# Patient Record
Sex: MALE | Race: WHITE | Employment: UNEMPLOYED
[De-identification: names, ages, dates, MRNs, and addresses within clinical notes are randomized per-mention and may not be internally consistent; named-entity substitution may affect disease eponyms.]

---

## 2017-06-22 ENCOUNTER — HOSPITAL ENCOUNTER (OUTPATIENT)
Dept: MRI IMAGING | Facility: CLINIC | Age: 42
Discharge: HOME | End: 2017-06-22
Payer: COMMERCIAL

## 2017-06-22 DIAGNOSIS — S46.211A BICEPS MUSCLE TEAR, RIGHT, INITIAL ENCOUNTER: ICD-10-CM

## 2017-06-22 PROCEDURE — 73221 MRI JOINT UPR EXTREM W/O DYE: CPT

## 2019-06-12 ENCOUNTER — HOSPITAL ENCOUNTER (OUTPATIENT)
Dept: PHYSICAL THERAPY | Facility: CLINIC | Age: 44
Setting detail: THERAPIES SERIES
Discharge: HOME | End: 2019-06-12
Payer: COMMERCIAL

## 2019-06-12 PROCEDURE — 97110 THERAPEUTIC EXERCISES: CPT

## 2019-06-12 PROCEDURE — 97161 PT EVAL LOW COMPLEX 20 MIN: CPT

## 2019-06-12 PROCEDURE — 97140 MANUAL THERAPY 1/> REGIONS: CPT

## 2019-06-12 NOTE — CONSULTS
[x] [facility]    [address] [place] [address]    Phone: [phone]    Fax:  [phone]     Physical Therapy Evaluation    Date:  2019  Patient: Clau Lancaster   : 1975  MRN: [id number]  Physician: Dr. Suzanne Le   Insurance:[facility] (12 visits 19 end date)  Medical Diagnosis:LBP   Rehab Codes: M54.16, M51.26  Onset date: 18     Next Dr's appt.: unknown    Subjective:   CC:Pt reports multiple compression injuries occurring over the years starting 9/15/2011. Had discectomy. Have had back pain since but in early August of last year it became worse. Just bent over slightly and reached for something small and had sharp pain in LB. Went to PT in April. Would only provide temporary relief of symptoms. Feel like something needs to pop in R LB. Once I had my wife hold at my tailbone area while leaning over R side LBP with R buttock and thigh pain. PMHx: [] Unremarkable [] Diabetes [] HTN  [] Pacemaker   [] MI/Heart Problems [] Cancer [] Arthritis [] Other:              [x] Refer to full medical chart  In EPIC     Tests: [] X-Ray: [x] MRI: Disc bulges L3-S1 with mild neural forminal narrowingL4-5, L5-S1 to the R, Also some ankylosing spondylitis noted.  [] none:     Medications: [x] Refer to full medical record [] None [] Other:  Allergies:      [x] Refer to full medical record [] None [] Other:    Working:  [x] Normal Duty  [] Light Duty  [] Off D/T Condition  [] Retired    [] Not Employed    []  Disability  [] Other:           Return to work:     Job/ADL Description:  [employer]     Pain:  [x] Yes  [] No Location: R LB and thigh Pain Rating: (0-10 scale)  7/10 now  Pain altered Tx:  [x] Yes  [] No  Action: Focused on manual techniques and stretching this date  Symptoms:  [] Improving [] Worsening [] Same  Better:  [] AM    [] PM    [] Sit    [] Not running  []Stand    [] Walk    [] Stretching  [x] Other: positions, dominant squatting and lunging for strength training and for back protection during functional activities  4. Able to hold corrections at pelvis and spine to allow pt to be more successful at holding good posture   5. Independent with Home Exercise Programs    LTG: (to be met in 10 treatments)  1. 5/5 hip strength in ext, abd, ER to allow pt to reduce load on back  2. Oswestry at 10% or less                 Patient goals: Get rid of pain    Rehab Potential:  [x] Good  [] Fair  [] Poor   Suggested Professional Referral:  [x] No  [] Yes:  Barriers to Goal Achievement[de-identified]  [x] No  [] Yes:  Domestic Concerns:  [x] No  [] Yes:    Pt. Education:  [x] Plans/Goals, Risks/Benefits discussed  [x] Home exercise program    Method of Education: [] Verbal  [] Demo  [x] Written- Hip flexor stretch, 2 pilates stretches, check and correct posture in sitting and standing  Comprehension of Education:  [x] Verbalizes understanding. [] Demonstrates understanding. [] Needs Review. [] Demonstrates/verbalizes understanding of HEP/Ed previously given.     Treatment Plan:  [x] Therapeutic Exercise    [] Therapeutic Activity  [x] Manual Therapy   [] Alter G treadmill  [] Phys perf test     [x] HP/CP   [] Neuromuscular Re-education [x] Dry Needling     [x] Instruction in HEP     [] Aquatic Therapy                           Frequency:  2x/week for 16 visits    Todays Treatment:  Manual: MET to correct R post innom rotation, MOB FRSR T10, MET R post rib 10, DI B hip flexor, glute med piriformis   Precautions: standard  Exercises:  Exercise Reps/ Time Weight/ Level Issued for HEP  Comments   Prone        Flying squirrels        Hip ext (glut max)                Supine        Hip flexor stretch 2'ea  x x    1 legged bridges        Frog legged bridges        Sidelying        Rotational Pilates stretch arm outstretched x5  x x    [place] pilates stretch x5  x x    Clams 90/30 deg        Ivory hip abd        Gym        Lunges        Monster walks Heel taps        Step downs     Posterior and lateral   Other:    Specific Instructions for next treatment:    Treatment Charges: Mins Units   [x] Evaluation       [x]  Low       []  Moderate       []  High 15 1   [] Phys perf test     []  Ther Exercise 15 1   [x]  Manual Therapy 20 1   []  Ther Activities     []  Aquatics     []  Vasocompression     []  NMR       TOTAL TREATMENT TIME: 50    Time in:1015   Time Out:1105    Electronically signed by: Deanna Cooks, PT          Physician Signature:________________________________Date:__________________  By signing above or cosigning this note, I have reviewed this plan of care and certify a need for medically necessary rehabilitation services.      *PLEASE SIGN ABOVE AND FAX BACK ALL PAGES*

## 2019-06-13 ENCOUNTER — HOSPITAL ENCOUNTER (OUTPATIENT)
Dept: PHYSICAL THERAPY | Facility: CLINIC | Age: 44
Setting detail: THERAPIES SERIES
Discharge: HOME | End: 2019-06-13
Payer: COMMERCIAL

## 2019-06-13 PROCEDURE — 97140 MANUAL THERAPY 1/> REGIONS: CPT

## 2019-06-13 PROCEDURE — 97110 THERAPEUTIC EXERCISES: CPT

## 2019-06-13 NOTE — FLOWSHEET NOTE
[] [facility]  [facility]  [address]  [phone]  F: [phone] [] [address]  [facility]   [address]  P: [phone]  F: [phone] [] [address]  [address] [facility]  P: [phone]  F: [phone] [] [address]  [facility]   [address]  [place]: [phone]  F: [phone]     Physical Therapy Daily Treatment Note    Date:  2019  Patient Name:  Margie Crowder    :  1975  MRN: [id number]  Physician: Dr. Vanita Estrada      Insurance:[facility] (12 visits 19 end date)  Medical Diagnosis:LBP          Rehab Codes: M54.16, M51.26  Onset date: 18                               Next 's appt.: unknown  Visit# / total visits: 212    Cancels/No Shows: 0    Subjective:    Pain:  [] Yes  [] No Location: LB, R buttock and thigh Pain Rating: (0-10 scale) 6/10  Pain altered Tx:  [] No  [] Yes  Action:  Comments:Felt looser when I left and less painful.  Tried doing hip flexor stretch again that night and it made the hip flexor cramp a little    Objective:  Manual: MET to correct R post innom rotation, MOB FRSR T10, MET R post rib 10, DI B hip flexor, glute med, piriformis   Precautions: standard  Exercises:  Exercise Reps/ Time Weight/ Level Issued for HEP   Comments   Prone             Hip ER roseanna  10x10\"    x  x     Hip ext (glut max)  2x10    x  x                   Supine             Hip flexor stretch 2'ea   x x     1 legged bridges             Frog legged bridges             Sidelying             Rotational Pilates stretch arm outstretched x5   x x     [place] pilates stretch x5   x x     Clams 90/30 deg  1 set ea    x  x     Ivory hip abd  1 set     x  x     Gym             Seated glute set x10  x x Single glute max and double   Lunges             Monster walks             Heel taps             Step downs         Posterior and lateral   Other:     Specific Instructions for next treatment:Continue manual techniques. Add Bridge and SL bridge          Treatment Charges: Mins Units   []  Modalities     [x]  Ther Exercise 30 2   [x]  Manual Therapy 20 1   []  Ther Activities     []  Aquatics     []  Vasocompression     []  Other     Total Treatment time 50 3       Assessment: [] Progressing toward goals. [] No change. [x] Other: Pt is in hamstring and erector spinae dominant muscle recruitment pattern. Having great difficulty recruiting glute max. With prone glute strengthening had to have pt give himself a manual cue to improve glute recruitment. More difficult on R than L and increased hip flexor spasm R vs L. Hamstring cramping present. STG: (to be met in 6 treatments)  1. ? Pain:3/10 and centralize to LB  2. ? ROM:Normal Trunk flexion and extension, normal hip extension B to allow pt to activate glutes for posture   3. ? Function: Pt able to perform sit to stand with ease and no compensation as well as able to perform glute dominant squatting and lunging for strength training and for back protection during functional activities  4. Able to hold corrections at pelvis and spine to allow pt to be more successful at holding good posture   5. Independent with Home Exercise Programs     LTG: (to be met in 12 treatments)  1. 5/5 hip strength in ext, abd, ER to allow pt to reduce load on back  2. Oswestry at 10% or less                 Patient goals: Get rid of pain    Goal treatment visit modified as mistake made in documentation on eval with visit number stating 5 for STG and 10 LTG. Should read as above with STG 6 treatments and LTG 12 treatments. Pt. Education:  [x] Yes  [] No  [x] Reviewed Prior HEP/Ed  Method of Education: [x] Verbal  [x] Demo  [x] Written  Comprehension of Education:  [x] Verbalizes understanding. [] Demonstrates understanding.   [x] Needs review. [] Demonstrates/verbalizes HEP/Ed previously given. Plan: [x] Continue per plan of care.    [] Other:      Time In:1250            Time Out: 1340    Electronically signed by:  Woody Tay PT

## 2019-06-18 ENCOUNTER — HOSPITAL ENCOUNTER (OUTPATIENT)
Dept: PHYSICAL THERAPY | Facility: CLINIC | Age: 44
Setting detail: THERAPIES SERIES
Discharge: HOME | End: 2019-06-18
Payer: COMMERCIAL

## 2019-06-18 PROCEDURE — 97110 THERAPEUTIC EXERCISES: CPT

## 2019-06-18 PROCEDURE — 97140 MANUAL THERAPY 1/> REGIONS: CPT

## 2019-06-18 NOTE — FLOWSHEET NOTE
[] [facility] &  Therapy  [address]  [phone]  F: [phone] [] [address]  [place] [address]   [address]  P: [phone]  F: [phone] [x] [address] &  Therapy  [address] [facility] [facility] [address]  P: [phone]  F: [phone] [] [address]  [facility]   [address]  [place]: [phone]  F: [phone]     Physical Therapy Daily Treatment Note    Date:  2019  Patient Name:  David Everett    :  1975  MRN: [id number]  Physician: Dr. Senia Altamirano      Insurance:[facility] (12 visits 19 end date)  Medical Diagnosis:LBP          Rehab Codes: M54.16, M51.26  Onset date: 18                               Next 's appt.: unknown  Visit# / total visits: 3/12    Cancels/No Shows: 0    Subjective:    Pain:  [x] Yes  [] No Location: LB, R buttock and thigh Pain Rating: (0-10 scale) 6/10  Pain altered Tx:  [] No  [] Yes  Action:  Comments: Pt reported his back is still painful to R side. Objective:  Manual: MET to correct R post innom rotation (L LE longer)  which did not correct (level pelvis/PSIS).  DI to L glute med, hip flexor DI proximal, IASTM to L glute med and piriformis with hypervolt, grade III lumbar rotation opening technique 5x5'' opening R, Grade IV SI joint rotation opening R 3x5''  Precautions: standard  Exercises:  Exercise Reps/ Time Weight/ Level Issued for HEP   Comments   Prone             Hip ER roseanna  10x10\"    x       Hip ext (glut max)  2x10    x  x                   Supine             Hip flexor stretch 2'ea   x x     1 legged bridges  20x      x     Frog legged bridges             Rocking piri S 15x3\"   x    Pushing piri S 3x30s   x    Sidelying             Rotational Pilates stretch arm outstretched x5   x      [place] pilates stretch x5   x      Clams 90  20x  Lime Grn  x  x     Ivory hip abd  1 set     x       Gym             Seated glute set x10  x  Single glute max and double   Lunges  3L      x     Monster walks  3L  blue tube    x     Heel taps             Step downs         Posterior and lateral   Other:     Specific Instructions for next treatment:Continue manual techniques. Treatment Charges: Mins Units   []  Modalities     [x]  Ther Exercise 25 2   [x]  Manual Therapy 20 1   []  Ther Activities     []  Aquatics     []  Vasocompression     []  Other     Total Treatment time 45 3       Assessment: [] Progressing toward goals. [] No change. [x] Other: progressed pt with more ROM exercises to improve ROM at glutes and decrease tightness. Completed sidelying lumbar stretch manually to stretch lumbar paraspinals. Added monster walks and lunges to further increase strength to glutes and hamstrings. Pt with good tolerance to exercises with no increases in pain noted. STG: (to be met in 6 treatments)  1. ? Pain:3/10 and centralize to LB  2. ? ROM:Normal Trunk flexion and extension, normal hip extension B to allow pt to activate glutes for posture   3. ? Function: Pt able to perform sit to stand with ease and no compensation as well as able to perform glute dominant squatting and lunging for strength training and for back protection during functional activities  4. Able to hold corrections at pelvis and spine to allow pt to be more successful at holding good posture   5. Independent with Home Exercise Programs     LTG: (to be met in 12 treatments)  1. 5/5 hip strength in ext, abd, ER to allow pt to reduce load on back  2. Oswestry at 10% or less                 Patient goals: Get rid of pain    Goal treatment visit modified as mistake made in documentation on eval with visit number stating 5 for STG and 10 LTG. Should read as above with STG 6 treatments and LTG 12 treatments.     Pt. Education:  [x] Yes  [] No  [x] Reviewed Prior HEP/Ed  Method of Education: [x] Verbal  [x] Demo  [x] Written  Comprehension of Education:  [x] Verbalizes understanding. [] Demonstrates understanding. [x] Needs review. [] Demonstrates/verbalizes HEP/Ed previously given. Plan: [x] Continue per plan of care. [] Other:      Time In:10:00am            Time Out: 10:48am    Electronically signed by:  JUSTO Gary.  This document has been reviewed by overseeing Physical Therapist, Mera Fritz, PT DPT

## 2019-06-19 ENCOUNTER — APPOINTMENT (OUTPATIENT)
Dept: PHYSICAL THERAPY | Facility: CLINIC | Age: 44
End: 2019-06-19
Payer: COMMERCIAL

## 2019-06-20 ENCOUNTER — APPOINTMENT (OUTPATIENT)
Dept: PHYSICAL THERAPY | Facility: CLINIC | Age: 44
End: 2019-06-20
Payer: COMMERCIAL

## 2019-06-24 ENCOUNTER — HOSPITAL ENCOUNTER (OUTPATIENT)
Dept: PHYSICAL THERAPY | Facility: CLINIC | Age: 44
Setting detail: THERAPIES SERIES
Discharge: HOME | End: 2019-06-24
Payer: COMMERCIAL

## 2019-06-24 PROCEDURE — 97110 THERAPEUTIC EXERCISES: CPT

## 2019-06-24 PROCEDURE — 97140 MANUAL THERAPY 1/> REGIONS: CPT

## 2019-06-27 ENCOUNTER — HOSPITAL ENCOUNTER (OUTPATIENT)
Dept: PHYSICAL THERAPY | Facility: CLINIC | Age: 44
Setting detail: THERAPIES SERIES
Discharge: HOME | End: 2019-06-27
Payer: COMMERCIAL

## 2019-06-27 PROCEDURE — 97140 MANUAL THERAPY 1/> REGIONS: CPT

## 2019-06-27 PROCEDURE — 97110 THERAPEUTIC EXERCISES: CPT

## 2019-06-27 NOTE — FLOWSHEET NOTE
[x] [place] [address]  Outpatient Rehabilitation &  Therapy  [address] [place] [address]  P: [phone]  F: [phone]     Physical Therapy Daily Treatment Note    Date:  2019  Patient Name:  Richelle Pitt    :  1975  MRN: [id number]  Physician: Dr. Giulia Davis      Insurance:[facility] (12 visits 19 end date)  Medical Diagnosis:LBP          Rehab Codes: M54.16, M51.26  Onset date: 18                               Next 's appt.: unknown  Visit# / total visits:     Cancels/No Shows: 0    Subjective:    Pain:  [x] Yes  [] No Location: LB, R buttock and thigh Pain Rating: (0-10 scale) 6/10  Pain altered Tx:  [] No  [] Yes  Action:  Comments: Pt reports his R leg is no longer hurting. Just into R buttock. Have been doing stretches    Objective:  Manual:   DI to L glute med, B hip flexor DI proximal, B hip flexor prox and distal  Precautions: standard  Exercises:  Exercise Reps/ Time Weight/ Level Issued for HEP   Comments   Prone             Hip ER roseanna  10x10\"    x  x     Hip ext (glut max)  2x10    x  x      L quad stretch  3x30\"      x     Supine             Hip flexor stretch 2'ea   x x     1 legged bridges  20x           Frog legged bridges             Rocking piri S 15x3\"       Pushing piri S 3x30s       Sidelying             Rotational Pilates stretch arm outstretched x5   x x     [place] pilates stretch x5   x x     Clams 90  20x  Lime Grn  x        hip abd  1 set   Lime  x      Gym             Seated glute set x10  x  Single glute max and double   Lunges  3L           Monster walks  3L  blue tube         4 way hip  x20  Blue  x  x     Step downs         Posterior and lateral   Other:     Specific Instructions for next treatment:Continue manual techniques.            Treatment Charges: Mins Units   []  Modalities     [x]  Ther Exercise 25 2   [x]  Manual Therapy 20 1   []  Ther Activities     []  Aquatics     []  Vasocompression     []  Other     Total Treatment time 45 3 Assessment: [] Progressing toward goals. [] No change. [x] Other: Added standing hip stability this date after patient was unable to get proper glute recruitment to complete flying squirrel exercise in prone. Pt laterally shifts heavily until cued to use glute and abdominals in single leg standing. STG: (to be met in 6 treatments)  1. ? Pain:3/10 and centralize to LB  2. ? ROM:Normal Trunk flexion and extension, normal hip extension B to allow pt to activate glutes for posture   3. ? Function: Pt able to perform sit to stand with ease and no compensation as well as able to perform glute dominant squatting and lunging for strength training and for back protection during functional activities  4. Able to hold corrections at pelvis and spine to allow pt to be more successful at holding good posture   5. Independent with Home Exercise Programs     LTG: (to be met in 12 treatments)  1. 5/5 hip strength in ext, abd, ER to allow pt to reduce load on back  2. Oswestry at 10% or less                 Patient goals: Get rid of pain    Goal treatment visit modified as mistake made in documentation on eval with visit number stating 5 for STG and 10 LTG. Should read as above with STG 6 treatments and LTG 12 treatments. Pt. Education:  [x] Yes  [] No  [x] Reviewed Prior HEP/Ed  Method of Education: [x] Verbal  [x] Demo  [x] Written  Comprehension of Education:  [x] Verbalizes understanding. [] Demonstrates understanding. [x] Needs review. [] Demonstrates/verbalizes HEP/Ed previously given. Plan: [x] Continue per plan of care.    [] Other:      Time In:1038           Time Out: 1130    Electronically signed by:  Julia Crow, PT

## 2019-07-01 ENCOUNTER — HOSPITAL ENCOUNTER (OUTPATIENT)
Dept: PHYSICAL THERAPY | Facility: CLINIC | Age: 44
Setting detail: THERAPIES SERIES
Discharge: HOME | End: 2019-07-01
Payer: COMMERCIAL

## 2019-07-01 PROCEDURE — 97110 THERAPEUTIC EXERCISES: CPT

## 2019-07-01 PROCEDURE — 97140 MANUAL THERAPY 1/> REGIONS: CPT

## 2019-07-03 ENCOUNTER — HOSPITAL ENCOUNTER (OUTPATIENT)
Dept: PHYSICAL THERAPY | Facility: CLINIC | Age: 44
Setting detail: THERAPIES SERIES
Discharge: HOME | End: 2019-07-03
Payer: COMMERCIAL

## 2019-07-03 PROCEDURE — 97110 THERAPEUTIC EXERCISES: CPT

## 2019-07-03 PROCEDURE — 97140 MANUAL THERAPY 1/> REGIONS: CPT

## 2019-07-03 NOTE — FLOWSHEET NOTE
[x] [address]   Outpatient Rehabilitation &  Therapy  [address] [facility]  P: [phone]  F: [phone]     Physical Therapy Daily Treatment Note    Date:  7/3/2019  Patient Name:  Pelon Truong    :  1975  MRN: [id number]  Physician: Dr. Иван Rodríguez      Insurance:[facility] (12 visits 19 end date)  Medical Diagnosis:LBP           Rehab Codes: M54.16, M51.26  Onset date: 18                               Next 's appt.: unknown  Visit# / total visits:     Cancels/No Shows: 0    Subjective:    Pain:  [x] Yes  [] No Location: LB, R buttock and thigh Pain Rating: (0-10 scale) 6/10  Pain altered Tx:  [] No  [] Yes  Action:  Comments: Pt reports his LB is doing a bit better. R buttock pain is still present. Went to PCP today and reflexes checked. States since back surgery has no patellar tendon reflex on R and diminished on L.     Objective:  Manual:  DI to L glute med, piriformis, DI  B hip flexor prox and distal  IDN to R SI joint/superior cluneal/inferior gluteal homeostatics and into R hip ER's- no adverse reactions, performed by Charanjit Menchaca, PT    Precautions: standard  Exercises:  Exercise Reps/ Time Weight/ Level Issued for HEP   Comments   Prone             Hip ER roseanna  10x10\"    x       Hip ext (glut max)  2x10    x        L quad stretch  3x30\"           Supine             Hip flexor stretch 2'ea   x x     Piriformis s 3x30\"  x x    1 legged bridges  20x           Frog legged bridges             Rocking piri S 15x3\"       Pushing piri S 3x30s       Sidelying             Rotational Pilates stretch arm outstretched x5   x      [place] pilates stretch x5   x      Clams 90  20x  Lime Grn  x        hip abd  1 set   Lime  x      Gym             Quadruped opp UE/LE 2x10       Side plank 2x10       Arm circles x20 Levine x     Pull aparts x20 Grey x     Kettle bell squats x20 22# x x    Lunges  3L           Monster walks  3L  blue tube x       4 way hip @CC  x10  20#  x  x   Single leg deadlift w/ kettle bell    #     Posterior and lateral   Other:     Specific Instructions for next treatment:Continue manual techniques. Treatment Charges: Mins Units   []  Modalities     [x]  Ther Exercise 15 1   [x]  Manual Therapy 30 2   []  Ther Activities     []  Aquatics     []  Vasocompression     []  Other     Total Treatment time 45 3       Assessment: [] Progressing toward goals. [] No change. [x] Other: Added IDN this date due to complaints of increased tightness and pain in R buttock. DI was minimally effective therefore a trial of IDN with Reji [place], PT  Added. Glute med and piriformis less tender and decreased spasm noted post IDN. Pt was more stable with 4 way hip this date. Less exercise performed however due to spasm and reports of fatigue with busy work day with multiple calls and fatigue from previous PT visit. STG: (to be met in 6 treatments)  1. ? Pain:3/10 and centralize to LB  2. ? ROM:Normal Trunk flexion and extension, normal hip extension B to allow pt to activate glutes for posture   3. ? Function: Pt able to perform sit to stand with ease and no compensation as well as able to perform glute dominant squatting and lunging for strength training and for back protection during functional activities  4. Able to hold corrections at pelvis and spine to allow pt to be more successful at holding good posture   5. Independent with Home Exercise Programs     LTG: (to be met in 12 treatments)  1. 5/5 hip strength in ext, abd, ER to allow pt to reduce load on back  2. Oswestry at 10% or less                 Patient goals: Get rid of pain    Goal treatment visit modified as mistake made in documentation on eval with visit number stating 5 for STG and 10 LTG. Should read as above with STG 6 treatments and LTG 12 treatments.     Pt. Education:  [x] Yes  [] No  [x] Reviewed Prior HEP/Ed  Method of Education: [x] Verbal  [x] Demo  [x] Written  Comprehension of

## 2019-07-09 ENCOUNTER — APPOINTMENT (OUTPATIENT)
Dept: PHYSICAL THERAPY | Facility: CLINIC | Age: 44
End: 2019-07-09
Payer: COMMERCIAL

## 2019-07-10 ENCOUNTER — HOSPITAL ENCOUNTER (OUTPATIENT)
Dept: PHYSICAL THERAPY | Facility: CLINIC | Age: 44
Setting detail: THERAPIES SERIES
Discharge: HOME | End: 2019-07-10
Payer: COMMERCIAL

## 2019-07-10 PROCEDURE — 97110 THERAPEUTIC EXERCISES: CPT

## 2019-07-10 PROCEDURE — 97140 MANUAL THERAPY 1/> REGIONS: CPT

## 2019-07-10 NOTE — FLOWSHEET NOTE
[x] [address]    Outpatient Rehabilitation &  Therapy  [facility]  P: [phone]  F: [phone]     Physical Therapy Daily Treatment Note    Date:  7/10/2019  Patient Name:  Victoria Pollock    :  1975  MRN: [id number]  Physician: Dr. Dion Fitzpatrick      Insurance:[facility] (12 visits 19 end date)  Medical Diagnosis:LBP           Rehab Codes: M54.16, M51.26  Onset date: 18                               Next Dr's appt.: unknown  Visit# / total visits:     Cancels/No Shows: 0    Subjective:    Pain:  [x] Yes  [] No Location: LB, R buttock and thigh Pain Rating: (0-10 scale) did not rate  Pain altered Tx:  [] No  [] Yes  Action:  Comments: Pt reports his LB is sore. I was on vacation ans found out I'm really out of shape. Was very sore after walking many stairs and sand dunes. Objective:  Manual:  DI to L glute med, piriformis, DI  B hip flexor prox and distal      Precautions: standard  Exercises:  Exercise Reps/ Time Weight/ Level Issued for HEP   Comments   Prone             Hip ER roseanna  10x10\"    x       Hip ext (glut max)  2x10    x        L quad stretch  3x30\"           Supine             Hip flexor stretch 2'ea   x x     Piriformis s 3x30\"  x x    1 legged bridges  20x           Frog legged bridges             Rocking piri S 15x3\"       Pushing piri S 3x30s       Sidelying             Rotational Pilates stretch arm outstretched x5   x      [place] pilates stretch x5   x      Clams 90  20x  Lime Grn  x        hip abd  1 set   Lime  x      Gym             Quadruped opp UE/LE 2x10       Side plank 2x10       Arm circles x20 Levine x     Pull aparts x20 Grey x     Kettle bell squats x20 22# off 18\" stroop x x    Lunges  3L           Monster walks  3L  blue tube x x     4 way hip @CC  x10  20#  x       Single leg deadlift w/ kettle bell 2x10  5#MB    x    Heel tap 2x10 4\" x x Fwd/retro   Other:     Specific Instructions for next treatment:Continue manual techniques. Treatment Charges: Mins Units   []  Modalities     [x]  Ther Exercise 30 2   [x]  Manual Therapy 15 1   []  Ther Activities     []  Aquatics     []  Vasocompression     []  Other     Total Treatment time 45 3       Assessment: [] Progressing toward goals. [] No change. [x] Other: Pt has less muscle spasm. Difficult to engage L glute especially with single leg deadlift. STG: (to be met in 6 treatments)  1. ? Pain:3/10 and centralize to LB  2. ? ROM:Normal Trunk flexion and extension, normal hip extension B to allow pt to activate glutes for posture   3. ? Function: Pt able to perform sit to stand with ease and no compensation as well as able to perform glute dominant squatting and lunging for strength training and for back protection during functional activities  4. Able to hold corrections at pelvis and spine to allow pt to be more successful at holding good posture   5. Independent with Home Exercise Programs     LTG: (to be met in 12 treatments)  1. 5/5 hip strength in ext, abd, ER to allow pt to reduce load on back  2. Oswestry at 10% or less                 Patient goals: Get rid of pain    Goal treatment visit modified as mistake made in documentation on eval with visit number stating 5 for STG and 10 LTG. Should read as above with STG 6 treatments and LTG 12 treatments. Pt. Education:  [x] Yes  [] No  [x] Reviewed Prior HEP/Ed  Method of Education: [x] Verbal  [x] Demo  [x] Written  Comprehension of Education:  [x] Verbalizes understanding. [] Demonstrates understanding. [x] Needs review. [] Demonstrates/verbalizes HEP/Ed previously given. Plan: [x] Continue per plan of care.    [] Other:      Time WK:6520          Time Out: 9515    Electronically signed by:  Elvi Spence, PT

## 2019-07-15 ENCOUNTER — HOSPITAL ENCOUNTER (OUTPATIENT)
Dept: PHYSICAL THERAPY | Facility: CLINIC | Age: 44
Setting detail: THERAPIES SERIES
Discharge: HOME | End: 2019-07-15
Payer: COMMERCIAL

## 2019-07-18 ENCOUNTER — HOSPITAL ENCOUNTER (OUTPATIENT)
Dept: PHYSICAL THERAPY | Facility: CLINIC | Age: 44
Setting detail: THERAPIES SERIES
Discharge: HOME | End: 2019-07-18
Payer: COMMERCIAL

## 2019-07-18 PROCEDURE — 97110 THERAPEUTIC EXERCISES: CPT

## 2019-07-18 PROCEDURE — 97140 MANUAL THERAPY 1/> REGIONS: CPT

## 2019-07-18 NOTE — FLOWSHEET NOTE
for next treatment:Continue manual techniques. Treatment Charges: Mins Units   []  Modalities     [x]  Ther Exercise 30 2   [x]  Manual Therapy 15 1   []  Ther Activities     []  Aquatics     []  Vasocompression     []  Other     Total Treatment time 45 3       Assessment: [] Progressing toward goals. [] No change. [x] Other: R glute fatigues quickly vs L. Pt still has tendency to remain in Anterior pelvic tilt    STG: (to be met in 6 treatments)  1. ? Pain:3/10 and centralize to LB  2. ? ROM:Normal Trunk flexion and extension, normal hip extension B to allow pt to activate glutes for posture   3. ? Function: Pt able to perform sit to stand with ease and no compensation as well as able to perform glute dominant squatting and lunging for strength training and for back protection during functional activities  4. Able to hold corrections at pelvis and spine to allow pt to be more successful at holding good posture   5. Independent with Home Exercise Programs     LTG: (to be met in 12 treatments)  1. 5/5 hip strength in ext, abd, ER to allow pt to reduce load on back  2. Oswestry at 10% or less                 Patient goals: Get rid of pain    Goal treatment visit modified as mistake made in documentation on eval with visit number stating 5 for STG and 10 LTG. Should read as above with STG 6 treatments and LTG 12 treatments. Pt. Education:  [x] Yes  [] No  [x] Reviewed Prior HEP/Ed  Method of Education: [x] Verbal  [x] Demo  [x] Written  Comprehension of Education:  [x] Verbalizes understanding. [] Demonstrates understanding. [x] Needs review. [] Demonstrates/verbalizes HEP/Ed previously given. Plan: [x] Continue per plan of care.    [] Other:      Time In:1210          Time Out: 1310    Electronically signed by:  Manuelito Andrade, PT

## 2019-07-22 ENCOUNTER — HOSPITAL ENCOUNTER (OUTPATIENT)
Dept: PHYSICAL THERAPY | Facility: CLINIC | Age: 44
Setting detail: THERAPIES SERIES
Discharge: HOME | End: 2019-07-22
Payer: COMMERCIAL

## 2019-07-22 PROCEDURE — 97140 MANUAL THERAPY 1/> REGIONS: CPT

## 2019-07-22 PROCEDURE — 97110 THERAPEUTIC EXERCISES: CPT

## 2019-07-22 NOTE — FLOWSHEET NOTE
Instructions for next treatment:Continue manual techniques. Treatment Charges: Mins Units   []  Modalities     [x]  Ther Exercise 15 1   [x]  Manual Therapy 25 2   []  Ther Activities     []  Aquatics     []  Vasocompression     []  Other     Total Treatment time 40 3       Assessment: [] Progressing toward goals. [] No change. [x] Other: Pt continues with complaints of R LBP. His hip extension is normal but stiff at end range B. He still compensates with erector spinae and hip flexors to gain spinal stability with glutes inhibition present. Pt wishes to be done with PT as he states is has helped to point out areas of weakness and has loosened hips but has not really made a change in pain at R LB    STG: (to be met in 6 treatments)  1. ? Pain:3/10 and centralize to LB  2. ? ROM:Normal Trunk flexion and extension, normal hip extension B to allow pt to activate glutes for posture   3. ? Function: Pt able to perform sit to stand with ease and no compensation as well as able to perform glute dominant squatting and lunging for strength training and for back protection during functional activities  4. Able to hold corrections at pelvis and spine to allow pt to be more successful at holding good posture   5. Independent with Home Exercise Programs     LTG: (to be met in 12 treatments)  1. 5/5 hip strength in ext, abd, ER to allow pt to reduce load on back  2. Oswestry at 10% or less                 Patient goals: Get rid of pain    Goal treatment visit modified as mistake made in documentation on eval with visit number stating 5 for STG and 10 LTG. Should read as above with STG 6 treatments and LTG 12 treatments. Pt. Education:  [x] Yes  [] No  [x] Reviewed Prior HEP/Ed  Method of Education: [x] Verbal  [x] Demo  [x] Written  Comprehension of Education:  [x] Verbalizes understanding. [] Demonstrates understanding. [x] Needs review. [] Demonstrates/verbalizes HEP/Ed previously given.      Plan: [x]

## 2019-07-25 ENCOUNTER — APPOINTMENT (OUTPATIENT)
Dept: PHYSICAL THERAPY | Facility: CLINIC | Age: 44
End: 2019-07-25
Payer: COMMERCIAL

## 2019-10-04 ENCOUNTER — HOSPITAL ENCOUNTER (OUTPATIENT)
Age: 44
Discharge: HOME | End: 2019-10-04

## 2022-11-02 ENCOUNTER — HOSPITAL ENCOUNTER (OUTPATIENT)
Age: 47
Discharge: HOME | End: 2022-11-02

## 2022-11-02 LAB — HBV SURFACE AB TITR SER: 85.03 MIU/ML

## 2022-11-02 PROCEDURE — 36415 COLL VENOUS BLD VENIPUNCTURE: CPT

## 2022-11-02 PROCEDURE — 86317 IMMUNOASSAY INFECTIOUS AGENT: CPT

## 2022-12-08 ENCOUNTER — HOSPITAL ENCOUNTER (OUTPATIENT)
Dept: PHYSICAL THERAPY | Facility: CLINIC | Age: 47
Setting detail: THERAPIES SERIES
Discharge: HOME | End: 2022-12-08
Payer: COMMERCIAL

## 2022-12-08 PROCEDURE — 97161 PT EVAL LOW COMPLEX 20 MIN: CPT

## 2022-12-08 PROCEDURE — 97140 MANUAL THERAPY 1/> REGIONS: CPT

## 2022-12-08 PROCEDURE — 97110 THERAPEUTIC EXERCISES: CPT

## 2022-12-13 ENCOUNTER — APPOINTMENT (OUTPATIENT)
Dept: PHYSICAL THERAPY | Facility: CLINIC | Age: 47
End: 2022-12-13
Payer: COMMERCIAL

## 2022-12-15 ENCOUNTER — HOSPITAL ENCOUNTER (OUTPATIENT)
Dept: PHYSICAL THERAPY | Facility: CLINIC | Age: 47
Setting detail: THERAPIES SERIES
Discharge: HOME | End: 2022-12-15
Payer: COMMERCIAL

## 2022-12-15 PROCEDURE — 97140 MANUAL THERAPY 1/> REGIONS: CPT

## 2022-12-15 PROCEDURE — 97110 THERAPEUTIC EXERCISES: CPT

## 2022-12-15 NOTE — FLOWSHEET NOTE
[] [facility] [address]  [address]  [phone]  F: [phone] [] [address]  [facility] 36   [address]  P: [phone]  F: [phone] [x] [address] &  Therapy  [facility]  P: [phone]  F: [phone] [] [address]  P: [phone]  F: [phone] [] [address]  [facility]   [address]   [place]: [phone]  F: [phone]      Physical Therapy Daily Treatment Note    Date:  12/15/2022  Patient Name:  Yvette Rocha    :  1975  MRN: [id number]  Physician: Petra Gill APRN, CNP, Anatoly Poe (surg)   Insurance: [facility] 22-22 3Xs/wk for 3wks (9 visits) Auth# [id number]  Medical Diagnosis:   Q06.019X (ICD-10-CM) - Strain of muscle, fascia and tendon of left hip, initial encounter   S39.012A (ICD-10-CM) - Strain of lumbar region   S76.912A (ICD-10-CM) - Strain of unspecified muscles, fascia and tendons at thigh level, left thigh, initial encounter   Rehab Codes: M54.9, M79.1, M99.06, M62.830, R20.0  Onset Date: 10/30/22             Next 's appt. : ?  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 Cont's w/ N&T, shooting nerve pain B feet and L LE. Pain:  [x] Yes  [] No Location:  LB, L LE      Pain Rating: (0-10 scale) 6-7/10  Pain altered Tx:  [x] No  [] Yes  Action:  Comments:    Objective:  Modalities:   Precautions:  Manual: DN T-L paraspinals, B hips, B LE, homeostatics in prone.  MFR/TPR- lumbar paraspinals, Gmed, piriformis, prone hip flex stretch  Exercises:  Exercise Reps/ Time Weight/ Level Comments   MET x prn               SKTC  3x30       Piriformis stretch 3x30       HS stretch  3x30       Hip flexor stretch  *              Core stab: TrA contr, march  *                Other: has foam roller and       Treatment Charges: Mins Units   []  Modalities     [x]  Ther Exercise 8:02-8:17 15 1   [x]  Manual Therapy  8:22-8:54 32 2   []  Ther Activities     []  Aquatics     []  Vasocompression     [x]  Other DN 8:17-8:22 5 NC   Total Treatment time 52        Assessment: [x] Progressing toward goals. Slight SIJ dysfunction noted today, corrected w/ MET. Mod tightness throughout lumbar and hip musculature. Cont'd w/ DN w/ incr dosage and added LE's today followed by manual as detailed above w/o any adverse reaction. Rev HEP and completed as noted w/ good kasia. Will cont as kasia. [] No change. [] Other:  [x] Patient would continue to benefit from skilled physical therapy services in order to: decrease tightness in lumbar paraspinals and surrounding hip musculature, increase abdominal/core strength to increase overall lumbar stabilization and function. STG: (to be met in 9 treatments)  ? Pain: Pt will report decreased pain to 4/10 with increased activity   ? ROM: Increase flexibility and AROM limitations throughout bilat hips and full lumbar AROM without pain to reduce difficulty with ADLs  ? Strength: Pt will demonstrate improved bilat LE strength to grossly 5/5 and demo good postural and core stability    Independent with Home Exercise Programs as demonstrated by performance with correct form without cues. ? Function: Pt to report improved sleep, ability to lift/carry and complete job duties w/o difficulty  5. Improve score on assessment tool from 44% impaired to 35% impaired demonstrating an improvement in overall function     Patient goals: relief    Pt. Education:  [x] Yes  [] No  [x] Reviewed Prior HEP/Ed  Method of Education: [x] Verbal  [x] Demo  [] Written  Comprehension of Education:  [x] Verbalizes understanding.   [] Demonstrates understanding. [] Needs review. [] Demonstrates/verbalizes HEP/Ed previously given. Plan: [x] Continue current frequency toward long and short term goals.     [x] Specific Instructions for subsequent treatments: cont to monitor, progress as kasia      Time In:8:02 am            Time Out: 8:55 am    Electronically signed by:  Jennifer Cooln PT

## 2022-12-19 ENCOUNTER — HOSPITAL ENCOUNTER (OUTPATIENT)
Dept: PHYSICAL THERAPY | Facility: CLINIC | Age: 47
Setting detail: THERAPIES SERIES
Discharge: HOME | End: 2022-12-19
Payer: COMMERCIAL

## 2022-12-19 PROCEDURE — 97110 THERAPEUTIC EXERCISES: CPT

## 2022-12-19 PROCEDURE — 97140 MANUAL THERAPY 1/> REGIONS: CPT

## 2022-12-19 NOTE — FLOWSHEET NOTE
[] [facility] [address]  [address]  [phone]  F: [phone] [] [address]  [facility] 36   [address]  P: [phone]  F: [phone] [x] [place] &  Therapy  [address] [facility]  P: [phone]  F: [phone] [] [address]  P: [phone]  F: [phone] [] [address]  [facility]   [address]   [place]: [phone]  F: [phone]      Physical Therapy Daily Treatment Note    Date:  2022  Patient Name:  Js Mcintyre    :  1975  MRN: [id number]  Physician: Sachin SANDOVAL, AIDAN, Bo Peña (surg)   Insurance: [facility] 22-22 3Xs/wk for 3wks (9 visits) Auth# [id number]  Medical Diagnosis:   Z63.302D (ICD-10-CM) - Strain of muscle, fascia and tendon of left hip, initial encounter   S39.012A (ICD-10-CM) - Strain of lumbar region   S76.912A (ICD-10-CM) - Strain of unspecified muscles, fascia and tendons at thigh level, left thigh, initial encounter   Rehab Codes: M54.9, M79.1, M99.06, M62.830, R20.0  Onset Date: 10/30/22             Next 's appt. : ?  Visit# / total visits: 3   Cancels/No Shows: 0    Subjective: Pt reports he's feeling pretty good, L foot felt much better and less cramping in feet after last session. Pain:  [x] Yes  [] No Location:  LB, L LE      Pain Rating: (0-10 scale) 4/10  Pain altered Tx:  [x] No  [] Yes  Action:  Comments:    Objective:  Modalities:   Precautions:  Manual: DN T-L paraspinals, B hips, B LE, homeostatics in prone.  MFR/TPR- lumbar paraspinals, Gmed, piriformis, prone hip flex stretch, B calves  Exercises:  Exercise Reps/ Time Weight/ Level Comments   MET x prn               SKTC  3x30  HEP     Piriformis stretch  3x30       HS stretch  3x30    w/ rope, add calf   Hip flexor stretch  *              Core stab: TrA contr, march 10x * progress         Andrea pose x     Cat, cow 10x           Other: has foam roller and       Treatment Charges: Mins Units   []  Modalities     [x]  Ther Exercise 5:40-6:00 20 2   [x]  Manual Therapy  5:05-5:38 33 2   []  Ther Activities     []  Aquatics     []  Vasocompression     [x]  Other DN 5:00-5:05 5 NC   Total Treatment time 58        Assessment: [x] Progressing toward goals. No SIJ dysfunction noted today. Mod tightness throughout lumbar L>R paraspinals and hip musculature. Cont'd w/ DN w/ incr dosage followed by manual as detailed above w/o any adverse reaction. Rev HEP and completed as noted w/ added andrea pose, cat cow and TrA contraction w/ good kasia. Pt reports ex feels good and no incr in symptoms. Will cont as kasia. [] No change. [] Other:  [x] Patient would continue to benefit from skilled physical therapy services in order to: decrease tightness in lumbar paraspinals and surrounding hip musculature, increase abdominal/core strength to increase overall lumbar stabilization and function. STG: (to be met in 9 treatments)  ? Pain: Pt will report decreased pain to 4/10 with increased activity   ? ROM: Increase flexibility and AROM limitations throughout bilat hips and full lumbar AROM without pain to reduce difficulty with ADLs  ? Strength: Pt will demonstrate improved bilat LE strength to grossly 5/5 and demo good postural and core stability    Independent with Home Exercise Programs as demonstrated by performance with correct form without cues. ? Function: Pt to report improved sleep, ability to lift/carry and complete job duties w/o difficulty  5. Improve score on assessment tool from 44% impaired to 35% impaired demonstrating an improvement in overall function     Patient goals: relief    Pt.  Education:  [x] Yes  [] No  [x] Reviewed Prior HEP/Ed  Method of Education: [x] Verbal  [x] Demo  [] Written  Comprehension of Education:  [x] Verbalizes understanding. [] Demonstrates understanding. [] Needs review. [] Demonstrates/verbalizes HEP/Ed previously given. Plan: [x] Continue current frequency toward long and short term goals.     [x] Specific Instructions for subsequent treatments: cont to monitor, progress as kasia      Time In: 5:00 pm            Time Out: 6:04 pm    Electronically signed by:  Rj Krause PT

## 2022-12-22 ENCOUNTER — HOSPITAL ENCOUNTER (OUTPATIENT)
Dept: PHYSICAL THERAPY | Facility: CLINIC | Age: 47
Setting detail: THERAPIES SERIES
Discharge: HOME | End: 2022-12-22
Payer: COMMERCIAL

## 2022-12-22 NOTE — FLOWSHEET NOTE
[] [address]  [address]    [phone]  F: [phone]   [] [address]  [facility] 36   [address]  P: [phone]  F: [phone]  [x] [address] &  Therapy  [address] [facility] [address]  P: [phone]  F: [phone] [] [address]  P: [phone]  F: [phone]  [] [address]  [address] [facility] 70   [address]   [place]: [phone]  F: [phone]   [] [address]  [place]: [phone]   F: [phone]     Physical Therapy Cancel/No Show note    Date: 2022  Patient: Melissa Perez  : 1975  MRN: [id number]    Cancels/No Shows to date:     For today's appointment patient:    [x]  Cancelled    [] Rescheduled appointment    [] No-show     Reason given by patient:    []  Patient ill    []  Conflicting appointment    [] No transportation      [x] Conflict with work    [] No reason given    [] Weather related    [] COVID-19    [] Other:      Comments:        [x] Next appointment was confirmed    Electronically signed by: Robert Bazzi PT

## 2022-12-27 ENCOUNTER — HOSPITAL ENCOUNTER (OUTPATIENT)
Dept: PHYSICAL THERAPY | Facility: CLINIC | Age: 47
Setting detail: THERAPIES SERIES
Discharge: HOME | End: 2022-12-27
Payer: COMMERCIAL

## 2022-12-28 NOTE — FLOWSHEET NOTE
[] [facility]  [address]    [phone]  F: [phone]   [] [address] [facility] 36   [address]  P: [phone]  F: [phone]  [] [address]  [address] [facility] [address]  P: [phone]  F: [phone] [] [address]  P: [phone]  F: [phone]  [] [address] [facility]   [address]   [place]: [phone]  F: [phone]   [] [address]  [place]: [phone]   F: [phone]     Physical Therapy Cancel/No Show note    Date: 2022  Patient: Jerzy Coronado  : 1975  MRN: [id number]    Cancels/No Shows to date: 3/1    For today's appointment patient:    [x]  Cancelled    [] Rescheduled appointment    [] No-show     Reason given by patient:    []  Patient ill    []  Conflicting appointment    [] No transportation      [x] Conflict with work    [] No reason given    [] Weather related    [] COVID-19    [x] Other:   still waiting on C-9 auth also   Comments:        [x] Next appointment was confirmed    Electronically signed by: Salome Figueroa PT

## 2022-12-29 ENCOUNTER — HOSPITAL ENCOUNTER (OUTPATIENT)
Dept: PHYSICAL THERAPY | Facility: CLINIC | Age: 47
Setting detail: THERAPIES SERIES
Discharge: HOME | End: 2022-12-29
Payer: COMMERCIAL

## 2022-12-29 PROCEDURE — 97140 MANUAL THERAPY 1/> REGIONS: CPT

## 2022-12-29 PROCEDURE — 97110 THERAPEUTIC EXERCISES: CPT

## 2022-12-29 NOTE — FLOWSHEET NOTE
[] [facility] [address]  [address]  [phone]  F: [phone] [] [address]  [facility] 36   [address]  P: [phone]  F: [phone] [x] [address] &  Therapy  [address] [facility] [address]  P: [phone]  F: [phone] [] [address]  P: [phone]  F: [phone] [] [address]  [facility]   [address]   [place]: [phone]  F: [phone]      Physical Therapy Daily Treatment Note    Date:  2022  Patient Name:  Yvon Nolan    :  1975  MRN: [id number]  Physician: Antonio SANDOVAL, Bryce BERMUDEZ (surg)   Insurance: [facility] 22-22 3Xs/wk for 3wks (9 visits) Auth# [id number], date ext 23*  Medical Diagnosis:   M96.406V (ICD-10-CM) - Strain of muscle, fascia and tendon of left hip, initial encounter   S39.012A (ICD-10-CM) - Strain of lumbar region   S76.912A (ICD-10-CM) - Strain of unspecified muscles, fascia and tendons at thigh level, left thigh, initial encounter   Rehab Codes: M54.9, M79.1, M99.06, M62.830, R20.0  Onset Date: 10/30/22             Next 's appt. : ?  Visit# / total visits:    Cancels/No Shows: 3/1    Subjective: Pt reports he's feeling pretty good overall. Hasn't done HEP last 2 days was feeling good but has also been busy and worked overtime. Cont's w/ cramping R>L foot. Pain:  [x] Yes  [] No Location:  LB, L LE      Pain Rating: (0-10 scale) 4/10  Pain altered Tx:  [x] No  [] Yes  Action:  Comments:    Objective:  Modalities:   Precautions:  Manual: DN T-L paraspinals, B hips, B LE's, homeostatics in prone.  MFR/TPR- lumbar paraspinals, Gmed, piriformis, prone hip flex stretch, B calves  Exercises:  Exercise Reps/ Time Weight/ Level Comments   MET x prn SKTC  3x30  HEP     Piriformis stretch  3x30       HS, calf stretch  3x30   At step today   Hip flexor stretch 1m  * EOB             Core stab: TrA contr, march 10x * progress         Andrea pose x     Cat, cow 10x           Other: has foam roller and       Treatment Charges: Mins Units   []  Modalities     [x]  Ther Exercise 6:38-7:01 23 2   [x]  Manual Therapy  6:05-6:35 30 2   []  Ther Activities     []  Aquatics     []  Vasocompression     [x]  Other DN 6:00-6:05 5 NC   Total Treatment time 58        Assessment: [x] Progressing toward goals. No SIJ dysfunction noted today. Mod tightness throughout lumbar L>R paraspinals and hip musculature. Cont'd w/ DN w/ incr dosage followed by manual as detailed above w/o any adverse reaction. Rev HEP and completed as noted w/ added hip flexor stretch and progressed HS and calf stretch to step. Pt notes muscle soreness w/ core stab ex, \"can feel the muscles. \"  Pt reports ex feel good and no incr in symptoms. Will cont as kasia. 1-2x weekly. [] No change. [] Other:  [x] Patient would continue to benefit from skilled physical therapy services in order to: decrease tightness in lumbar paraspinals and surrounding hip musculature, increase abdominal/core strength to increase overall lumbar stabilization and function. STG: (to be met in 9 treatments)  ? Pain: Pt will report decreased pain to 4/10 with increased activity   ? ROM: Increase flexibility and AROM limitations throughout bilat hips and full lumbar AROM without pain to reduce difficulty with ADLs  ? Strength: Pt will demonstrate improved bilat LE strength to grossly 5/5 and demo good postural and core stability    Independent with Home Exercise Programs as demonstrated by performance with correct form without cues. ? Function: Pt to report improved sleep, ability to lift/carry and complete job duties w/o difficulty  6.    Improve score on assessment tool from 44% impaired to 35% impaired demonstrating an improvement in overall function     Patient goals: relief    Pt. Education:  [x] Yes  [] No  [x] Reviewed Prior HEP/Ed  Method of Education: [x] Verbal  [x] Demo  [] Written  Comprehension of Education:  [x] Verbalizes understanding. [] Demonstrates understanding. [] Needs review. [] Demonstrates/verbalizes HEP/Ed previously given. Plan: [x] Continue current frequency toward long and short term goals.     [x] Specific Instructions for subsequent treatments: cont to monitor, progress as kasia      Time In: 6:00 pm            Time Out: 7:04 pm    Electronically signed by:  Aman Dunham, PT

## 2023-01-05 ENCOUNTER — HOSPITAL ENCOUNTER (OUTPATIENT)
Dept: PHYSICAL THERAPY | Facility: CLINIC | Age: 48
Setting detail: THERAPIES SERIES
Discharge: HOME | End: 2023-01-05
Payer: COMMERCIAL

## 2023-01-05 PROCEDURE — 97140 MANUAL THERAPY 1/> REGIONS: CPT

## 2023-01-05 PROCEDURE — 97110 THERAPEUTIC EXERCISES: CPT

## 2023-01-05 NOTE — FLOWSHEET NOTE
212    [] [facility]) [facility] &  [facility]  [address]  [phone]  F: [phone] [] [address]  [employer] [address]   [address]  P: [phone]  F: [phone] [x] [place] &  Therapy  [address]  P: [phone]  F: [phone] [] [address]  P: [phone]  F: [phone] [] [address]  [facility]   [address]   Sarah Mylesv: [phone]  F: [phone]      Physical Therapy Daily Treatment Note    Date:  2023  Patient Name:  Farideh Rashid    :  1975  MRN: [id number]  Physician: Tatum SANDOVAL, AIDAN, Steven Pulido (surg)   Insurance: [facility] 22-22 3Xs/wk for 3wks (9 visits) Auth# [id number], date ext 23*  Medical Diagnosis:   T23.278S (ICD-10-CM) - Strain of muscle, fascia and tendon of left hip, initial encounter   S39.012A (ICD-10-CM) - Strain of lumbar region   S76.912A (ICD-10-CM) - Strain of unspecified muscles, fascia and tendons at thigh level, left thigh, initial encounter   Rehab Codes: M54.9, M79.1, M99.06, M62.830, R20.0  Onset Date: 10/30/22             Next 's appt. : ?  Visit# / total visits:    Cancels/No Shows: 3/1    Subjective: Pt reports he's sore today but has done alot of sitting the last few days and did have an independent evaluation for his claim earlier this week. HEP as kasia. Cont's w/ cramping and N&T R>L foot. States he felt good after last treatment and feels beneficial.    Pain:  [x] Yes  [] No Location:  LB, L LE      Pain Rating: (0-10 scale) 1-2/10  Pain altered Tx:  [x] No  [] Yes  Action:  Comments:    Objective:  Modalities:   Precautions:  Manual: DN T-L paraspinals, B hips, B LE's, homeostatics in prone.  MFR/TPR- lumbar paraspinals, Gmed, piriformis, prone hip flex stretch, B calves  Exercises:  Exercise Reps/ Time Weight/ Level Comments   MET x prn               SKTC  3x30  HEP     Piriformis stretch  3x30       HS, calf stretch  3x30   At step today   Hip flexor stretch 1m  * EOB   ITb stretch   x  *           Core stab: TrA contr, march 10x * progress   bridges 10x *    Clam shell 10x2 *          Andrea pose x     Cat, cow 10x           Monster walks      4 way hip            Other: has foam roller and       Treatment Charges: Mins Units   []  Modalities     [x]  Ther Exercise 2:33-3:03 30 2   [x]  Manual Therapy  2:05-2:30 25 2   []  Ther Activities     []  Aquatics     []  Vasocompression     [x]  Other DN 2:00-2:05 5 NC   Total Treatment time 58        Assessment: [x] Progressing toward goals. No SIJ dysfunction noted today. Cont's w/ mod muscular/fascial tightness throughout lumbar R>L paraspinals today and hip musculature. DN followed by manual as detailed above w/o any adverse reaction. Rev HEP and completed as noted, progressed core ex w/ added bridges, clam shells and ITband stretch. Pt kasia well w/o incr symptoms. Issued HO of new ex for HEP and encouraged daily. Will cont as kasia. 2x weekly as pt's schedule allows. [] No change. [] Other:  [x] Patient would continue to benefit from skilled physical therapy services in order to: decrease tightness in lumbar paraspinals and surrounding hip musculature, increase abdominal/core strength to increase overall lumbar stabilization and function. STG: (to be met in 9 treatments)  ? Pain: Pt will report decreased pain to 4/10 with increased activity   ? ROM: Increase flexibility and AROM limitations throughout bilat hips and full lumbar AROM without pain to reduce difficulty with ADLs  ?  Strength: Pt will demonstrate improved bilat LE strength to grossly 5/5 and demo good postural and core stability    Independent with Home Exercise Programs as demonstrated by performance with correct form without cues.  ? Function: Pt to report improved sleep, ability to lift/carry and complete job duties w/o difficulty  6. Improve score on assessment tool from 44% impaired to 35% impaired demonstrating an improvement in overall function     Patient goals: relief    Pt. Education:  [x] Yes  [] No  [x] Reviewed Prior HEP/Ed  Method of Education: [x] Verbal  [x] Demo  [] Written  Comprehension of Education:  [x] Verbalizes understanding. [] Demonstrates understanding. [] Needs review. [] Demonstrates/verbalizes HEP/Ed previously given. Plan: [x] Continue current frequency toward long and short term goals.     [x] Specific Instructions for subsequent treatments: cont to monitor, progress as kasia      Time In: 2:00 pm            Time Out: 3:06 pm    Electronically signed by:  Keira Middleton PT

## 2023-01-09 ENCOUNTER — HOSPITAL ENCOUNTER (OUTPATIENT)
Dept: PHYSICAL THERAPY | Facility: CLINIC | Age: 48
Setting detail: THERAPIES SERIES
Discharge: HOME | End: 2023-01-09
Payer: COMMERCIAL

## 2023-01-09 PROCEDURE — 97140 MANUAL THERAPY 1/> REGIONS: CPT

## 2023-01-09 PROCEDURE — 97110 THERAPEUTIC EXERCISES: CPT

## 2023-01-09 NOTE — FLOWSHEET NOTE
212    [] [facility]) [facility] &  Therapy  [address]  [phone]  F: [phone] [] [address]  [facility] [facility]   [address]  P: [phone]  F: [phone] [x] [place] &  Therapy  [address]  P: [phone]  F: [phone] [] [address]  P: [phone]  F: [phone] [] [address]  [facility]   [address]   [place]: [phone]  F: [phone]      Physical Therapy Daily Treatment Note    Date:  2023  Patient Name:  Tony Albrecht    :  1975  MRN: [id number]  Physician: Carmen SANDOVAL, AIDAN, Merrill Waite (surg)   Insurance: [facility] 22-22 3Xs/wk for 3wks (9 visits) Auth# [id number], date ext 23*  Medical Diagnosis:   P55.099X (ICD-10-CM) - Strain of muscle, fascia and tendon of left hip, initial encounter   S39.012A (ICD-10-CM) - Strain of lumbar region   S76.912A (ICD-10-CM) - Strain of unspecified muscles, fascia and tendons at thigh level, left thigh, initial encounter   Rehab Codes: M54.9, M79.1, M99.06, M62.830, R20.0  Onset Date: 10/30/22             Next 's appt.: hearing 23  Visit# / total visits:    Cancels/No Shows: 3/1    Subjective: Pt reports he's stiff and sore, was up early stretching. 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 Notes back is a little better, feet are most bothersome. Cont's w/ intermittent cramping/pain and constant N&T R>L foot. States he feels good after treatment and feels beneficial, notes HEP is going really well.     Pain:  [x] Yes  [] No Location:  LB, L LE      Pain Rating: (0-10 scale) 1-2/10  Pain altered Tx:  [x] No  [] Yes  Action:  Comments:    Objective:  Modalities:   Precautions:  Manual: MAGO TGilesL paraspinals, B hips, B LE's, homeostatics in prone. MFR/TPR- lumbar paraspinals, Gmed, piriformis, prone hip flex stretch, B calves  Exercises:  Exercise Reps/ Time Weight/ Level Comments   MET x prn               SKTC  3x30  HEP     Piriformis stretch  3x30       HS, calf stretch  3x30   At step today   Hip flexor stretch 1m   EOB   ITb stretch   x            Core stab: TrA contr, march 10x  progress   bridges 10x     Clam shell 10x2           Andrea pose x     Cat, cow 10x           Post shld rolls 10x *    Scap retraction 10x *    Thoracic stretch x * Hands clasped, or at door frame   Monster walks      4 way hip  lime          Other: has foam roller and       Treatment Charges: Mins Units   []  Modalities     [x]  Ther Exercise 9:35-10:05 30 2   [x]  Manual Therapy  9:10-9:35 25 2   []  Ther Activities     []  Aquatics     []  Vasocompression     [x]  Other DN 9:05-9:10 5 NC   Total Treatment time 60        Assessment: [x] Progressing toward goals. Pt maintaining good SIJ alignment, cont's w/ mod muscular/fascial tightness throughout lumbar R>L paraspinals and hip musculature. DN followed by manual as detailed above w/o any adverse reaction. Mod tightness noted throughout thoracic spine and pt notes it feels stiff, added post shld rolls, scapular retraction and thoracic stretch to improve mobility and posture. Rev HEP and completed as noted, kasia progressions well last visit. Pt kasia well w/o incr symptoms. Will cont as kasia. 2x weekly as pt's schedule allows remaining visits. [] No change. [] Other:  [x] Patient would continue to benefit from skilled physical therapy services in order to: decrease tightness in lumbar paraspinals and surrounding hip musculature, increase abdominal/core strength to increase overall lumbar stabilization and function. STG: (to be met in 9 treatments)  ? Pain: Pt will report decreased pain to 4/10 with increased activity   ?  ROM: Increase flexibility and AROM limitations throughout bilat hips and full lumbar AROM without pain to reduce difficulty with ADLs  ? Strength: Pt will demonstrate improved bilat LE strength to grossly 5/5 and demo good postural and core stability    Independent with Home Exercise Programs as demonstrated by performance with correct form without cues. ? Function: Pt to report improved sleep, ability to lift/carry and complete job duties w/o difficulty  6. Improve score on assessment tool from 44% impaired to 35% impaired demonstrating an improvement in overall function     Patient goals: relief    Pt. Education:  [x] Yes  [] No  [x] Reviewed Prior HEP/Ed  Method of Education: [x] Verbal  [x] Demo  [] Written  Comprehension of Education:  [x] Verbalizes understanding. [] Demonstrates understanding. [] Needs review. [] Demonstrates/verbalizes HEP/Ed previously given. Plan: [x] Continue current frequency toward long and short term goals.     [x] Specific Instructions for subsequent treatments: cont to monitor, progress as kasia      Time In: 9:05 am            Time Out: 10:07  am    Electronically signed by:  Brian Cox PT

## 2023-01-12 ENCOUNTER — HOSPITAL ENCOUNTER (OUTPATIENT)
Dept: PHYSICAL THERAPY | Facility: CLINIC | Age: 48
Setting detail: THERAPIES SERIES
Discharge: HOME | End: 2023-01-12
Payer: COMMERCIAL

## 2023-01-12 PROCEDURE — 97110 THERAPEUTIC EXERCISES: CPT

## 2023-01-12 PROCEDURE — 97140 MANUAL THERAPY 1/> REGIONS: CPT

## 2023-01-12 NOTE — FLOWSHEET NOTE
212    [] [facility]) [facility] &  Therapy  [address]  [phone]  F: [phone] [] [address]  [facility] [address]   [address]  P: [phone]  F: [phone] [x] [place] &  Therapy  [address]  P: [phone]  F: [phone] [] [address]  P: [phone]  F: [phone] [] [address]  [facility]   [address]   [place]: [phone]  F: [phone]      Physical Therapy Daily Treatment Note    Date:  2023  Patient Name:  Isac Escobar    :  1975  MRN: [id number]  Physician: Beth SANDOVAL, AIDAN, Linda Gutierrez (surg)   Insurance: [facility] 22-22 3Xs/wk for 3wks (9 visits) Auth# [id number], date ext 23*  Medical Diagnosis:   P30.698R (ICD-10-CM) - Strain of muscle, fascia and tendon of left hip, initial encounter   S39.012A (ICD-10-CM) - Strain of lumbar region   S76.912A (ICD-10-CM) - Strain of unspecified muscles, fascia and tendons at thigh level, left thigh, initial encounter   Rehab Codes: M54.9, M79.1, M99.06, M62.830, R20.0  Onset Date: 10/30/22             Next 's appt.: hearing next week? Visit# / total visits:    Cancels/No Shows: 3/1    Subjective: Pt reports his back feels pretty good, feet still cramping jackie w/o shoes and shooting pains in lower legs, constant N&T R>L foot. States he feels good after treatment and feels dry needling and manual beneficial, would like to cont w/ PT. Sleeping better. Notes HEP is going really well, plans to resume some weight lifting today.     Pain:  [x] Yes  [] No Location:  LB, L LE      Pain Rating: (0-10 scale) 3/10  Pain altered Tx:  [x] No  [] Yes  Action:  Comments:    Objective:  Modalities:   Precautions:  Manual: DN T-L paraspinals, B hips, B LE's, homeostatics in prone. MFR/TPR- lumbar paraspinals, Gmed, piriformis, prone hip flex stretch, B calves  Exercises:  Exercise Reps/ Time Weight/ Level Comments   MET x prn               SKTC  3x30  HEP     Piriformis stretch  3x30       HS, calf stretch  3x30   At step today   Hip flexor stretch 1m   EOB   ITb stretch   x            Core stab: TrA contr, march 10x  progress   bridges 10x     Clam shell 10x2           Andrea pose x     Cat, cow 10x           Post shld rolls 10x *    Scap retraction 10x *    Thoracic stretch x * Hands clasped, or at door frame         Ankle ROM x * Circles, DF/PF, INV/EVER   Toe yoga, foot dome 10x ea *          Monster walks      4 way hip  lime          Other: has foam roller and       Treatment Charges: Mins Units   []  Modalities     [x]  Ther Exercise 10:35-11:05 30 2   [x]  Manual Therapy  10:10-10:35 25 2   []  Ther Activities     []  Aquatics     []  Vasocompression     [x]  Other DN 10:05-10:10 5 NC   Total Treatment time 60        Assessment: [x] Progressing toward goals. Pt maintaining good SIJ alignment, cont's w/ muscular/fascial tightness throughout thoracic and lumbar R>L paraspinals and hip musculature. DN w/ incr dosage to thoracic spine followed by manual as detailed above w/o any adverse reaction. Rev HEP and completed as noted, added intrinsic foot muscle ex and rev ankle ROM ex. Issued HO of new HEP. Pt kasia progressions well w/o incr in symptoms. Will cont as kasia. 2x weekly as pt's schedule allows remaining visits. PN sent requesting additional visits. [] No change. [x] Other: B LEs strength 5/5  [x] Patient would continue to benefit from skilled physical therapy services in order to: decrease tightness in lumbar paraspinals and surrounding hip musculature, increase abdominal/core strength to increase overall lumbar stabilization and function. STG: (to be met in 9 treatments)  ?  Pain: Pt will report decreased pain to 4/10 with increased activity - MET  ? ROM: Increase flexibility and AROM limitations throughout bilat hips and full lumbar AROM without pain to reduce difficulty with ADLs- progressing towards  ? Strength: Pt will demonstrate improved bilat LE strength to grossly 5/5 and demo good postural and core stability- MET    Independent with Home Exercise Programs as demonstrated by performance with correct form without cues. - progressing towards  ? Function: Pt to report improved sleep, ability to lift/carry and complete job duties w/o difficulty- progressing towards  6. Improve score on assessment tool from 44% impaired to 35% impaired demonstrating an improvement in overall function-progressing towards currently 42%     Patient goals: relief    Pt. Education:  [x] Yes  [] No  [x] Reviewed Prior HEP/Ed  Method of Education: [x] Verbal  [x] Demo  [x] Written  Comprehension of Education:  [x] Verbalizes understanding. [] Demonstrates understanding. [] Needs review. [x] Demonstrates/verbalizes HEP/Ed previously given. Plan: [x] Continue current frequency toward long and short term goals. [x] Specific Instructions for subsequent treatments:  Will request additional visits/C-9 auth      Time In: 10:05 am            Time Out: 11:08  am    Electronically signed by:  Lon Chaudhari, PT

## 2023-01-13 NOTE — PROGRESS NOTES
[] [facility]) [facility] &  Therapy  [address]  [phone]  F: [phone] [] [address]  [employer] [address]   [address]  P: [phone]  F: [phone] [x] [address] &  Therapy  [address]  P: [phone]  F: [phone] [] [address]  P: [phone]  F: [phone] [] [address]  [facility]   [address]   [place]: [phone]  F: [phone]      Physical Therapy Progress Note    Date: 2023      Patient: Isac Escobar  : 1975  MRN: [id number]  Physician: Beth SANDOVAL CNP, Linda Gutierrez (surg)   Insurance: [address] [place] [address] 22-22 3Xs/wk for 3wks (9 visits) Auth# [id number], date ext 23*  Medical Diagnosis:   M46.174A (ICD-10-CM) - Strain of muscle, fascia and tendon of left hip, initial encounter   S39.012A (ICD-10-CM) - Strain of lumbar region   S76.912A (ICD-10-CM) - Strain of unspecified muscles, fascia and tendons at thigh level, left thigh, initial encounter   Rehab Codes: M54.9, M79.1, M99.06, M62.830, R20.0  Onset Date: 10/30/22             Next 's appt.: hearing next week? Visit# / total visits:             Cancels/No Shows: 3/1     Subjective: Pt reports his back feels pretty good, feet still cramping jackie w/o shoes and shooting pains in lower legs, constant N&T R>L foot. States he feels good after treatment and feels dry needling and manual beneficial, would like to cont w/ PT. Sleeping better. Notes HEP is going really well, plans to resume some weight lifting today. Pain:  [x] Yes  [] No   Location:  LB, L LE      Pain Rating: (0-10 scale) 3/10  Pain altered Tx:  [x] No  [] Yes  Action:  Comments:    Objective:  Test Measurements: B LEs strength grossly 5/5.  Lumbar spine ROM [facility] but painful jackie w/ flexion and \"tight\" end ranges  Function: Pt w/ decr pain, improved mobility, strength and sleeping better, progressing well w/ PT. Cont's w/ cramps, pain and N&T in B LE's/feet    Assessment:  STG: (to be met in 9 treatments)  ? Pain: Pt will report decreased pain to 4/10 with increased activity - MET  ? ROM: Increase flexibility and AROM limitations throughout bilat hips and full lumbar AROM without pain to reduce difficulty with ADLs- progressing towards  ? Strength: Pt will demonstrate improved bilat LE strength to grossly 5/5 and demo good postural and core stability- MET    Independent with Home Exercise Programs as demonstrated by performance with correct form without cues. - progressing towards  ? Function: Pt to report improved sleep, ability to lift/carry and complete job duties w/o difficulty- progressing towards  6. Improve score on assessment tool from 44% impaired to 35% impaired demonstrating an improvement in overall function-progressing towards currently 42%     Patient goals: relief    Treatment Plan:  [x] Therapeutic Exercise   00133  [] Iontophoresis: 4 mg/mL Dexamethasone Sodium Phosphate  mAmin  13724   [] Therapeutic Activity  45307 [] Vasopneumatic cold with compression  44835    [] Gait Training   69920 [] Ultrasound   90341   [] Neuromuscular Re-education  83244 [] Electrical Stimulation Unattended  30408   [x] Manual Therapy  19054 [] Electrical Stimulation Attended  70645   [x] Instruction in HEP  [] Lumbar/Cervical Traction  77838   [] Aquatic Therapy   13859 [] Cold/hotpack    [] Massage   96468      [] Dry Needling, 1 or 2 muscles  98854   [] Biofeedback, first 15 minutes   83130  [] Biofeedback, additional 15 minutes   45697 [x] Dry Needling, 3 or more muscles  56361       Patient Status:     [x] Continue per initial plan of care.     [x] Additional visits necessary to further progress towards goals, Will need C-9 auth*    [] Other:     Requested Frequency/Duration: 1-2 times per week for 6 treatments. Electronically signed by Elena Ray PT on 1/12/2023 at 7:58 PM      If you have any questions or concerns, please don't hesitate to call. Thank you for your referral.    Physician Signature:________________________________Date:__________________  By signing above or cosigning this note, I have reviewed this plan of care and certify a need for medically necessary rehabilitation services.      *PLEASE SIGN ABOVE AND FAX BACK ALL PAGES*

## 2025-01-03 ENCOUNTER — HOSPITAL ENCOUNTER (EMERGENCY)
Age: 50
Discharge: HOME | End: 2025-01-03
Attending: EMERGENCY MEDICINE
Payer: COMMERCIAL

## 2025-01-03 VITALS
DIASTOLIC BLOOD PRESSURE: 106 MMHG | OXYGEN SATURATION: 98 % | HEART RATE: 88 BPM | SYSTOLIC BLOOD PRESSURE: 160 MMHG | WEIGHT: 150 LBS | TEMPERATURE: 97.5 F | RESPIRATION RATE: 18 BRPM

## 2025-01-03 DIAGNOSIS — S39.012A STRAIN OF LUMBAR REGION, INITIAL ENCOUNTER: Primary | ICD-10-CM

## 2025-01-03 PROCEDURE — 6370000000 HC RX 637 (ALT 250 FOR IP)

## 2025-01-03 PROCEDURE — 99284 EMERGENCY DEPT VISIT MOD MDM: CPT

## 2025-01-03 PROCEDURE — 96372 THER/PROPH/DIAG INJ SC/IM: CPT

## 2025-01-03 PROCEDURE — 6360000002 HC RX W HCPCS

## 2025-01-03 RX ORDER — KETOROLAC TROMETHAMINE 30 MG/ML
30 INJECTION, SOLUTION INTRAMUSCULAR; INTRAVENOUS ONCE
Status: COMPLETED | OUTPATIENT
Start: 2025-01-03 | End: 2025-01-03

## 2025-01-03 RX ORDER — PREDNISONE 10 MG/1
TABLET ORAL
Qty: 20 TABLET | Refills: 0 | Status: SHIPPED | OUTPATIENT
Start: 2025-01-03 | End: 2025-01-03

## 2025-01-03 RX ORDER — PREDNISONE 10 MG/1
TABLET ORAL
Qty: 20 TABLET | Refills: 0 | Status: SHIPPED | OUTPATIENT
Start: 2025-01-03 | End: 2025-01-13

## 2025-01-03 RX ORDER — LIDOCAINE 4 G/G
1 PATCH TOPICAL DAILY
Qty: 30 PATCH | Refills: 0 | Status: SHIPPED | OUTPATIENT
Start: 2025-01-03 | End: 2025-01-03

## 2025-01-03 RX ORDER — GABAPENTIN 300 MG/1
300 CAPSULE ORAL 3 TIMES DAILY
COMMUNITY
Start: 2024-02-22

## 2025-01-03 RX ORDER — IBUPROFEN 600 MG/1
600 TABLET, FILM COATED ORAL EVERY 6 HOURS PRN
Qty: 30 TABLET | Refills: 0 | Status: SHIPPED | OUTPATIENT
Start: 2025-01-03

## 2025-01-03 RX ORDER — LOSARTAN POTASSIUM 50 MG/1
100 TABLET ORAL DAILY
COMMUNITY

## 2025-01-03 RX ORDER — IBUPROFEN 600 MG/1
600 TABLET, FILM COATED ORAL EVERY 6 HOURS PRN
Qty: 30 TABLET | Refills: 0 | Status: SHIPPED | OUTPATIENT
Start: 2025-01-03 | End: 2025-01-03

## 2025-01-03 RX ORDER — BACLOFEN 10 MG/1
10 TABLET ORAL 3 TIMES DAILY
Qty: 15 TABLET | Refills: 0 | Status: SHIPPED | OUTPATIENT
Start: 2025-01-03

## 2025-01-03 RX ORDER — BACLOFEN 10 MG/1
10 TABLET ORAL 3 TIMES DAILY
Qty: 15 TABLET | Refills: 0 | Status: SHIPPED | OUTPATIENT
Start: 2025-01-03 | End: 2025-01-03

## 2025-01-03 RX ORDER — PREDNISONE 20 MG/1
60 TABLET ORAL ONCE
Status: COMPLETED | OUTPATIENT
Start: 2025-01-03 | End: 2025-01-03

## 2025-01-03 RX ORDER — LIDOCAINE 4 G/G
1 PATCH TOPICAL ONCE
Status: DISCONTINUED | OUTPATIENT
Start: 2025-01-03 | End: 2025-01-03 | Stop reason: HOSPADM

## 2025-01-03 RX ORDER — LIDOCAINE 4 G/G
1 PATCH TOPICAL DAILY
Qty: 30 PATCH | Refills: 0 | Status: SHIPPED | OUTPATIENT
Start: 2025-01-03 | End: 2025-02-02

## 2025-01-03 RX ORDER — ATENOLOL 100 MG/1
100 TABLET ORAL
COMMUNITY

## 2025-01-03 RX ADMIN — KETOROLAC TROMETHAMINE 30 MG: 30 INJECTION, SOLUTION INTRAMUSCULAR; INTRAVENOUS at 14:22

## 2025-01-03 RX ADMIN — PREDNISONE 60 MG: 20 TABLET ORAL at 14:22

## 2025-01-03 NOTE — ED NOTES
Patient to ED for right lower back injury. Patient states he was at work helping someone to walk and they fell, pulling him down and straining his back. Patient states he has pain on the right lower back that radiates down to his lower leg. Rates pain 2/10 when sitting but states it is very painful to walk. Patient alert and oriented x4, talking in complete sentences. Respirations even and unlabored. Call light in reach, all needs met at this time

## 2025-01-03 NOTE — ED PROVIDER NOTES
[facility] EMERGENCY DEPARTMENT  Emergency Department Encounter  Emergency Medicine Resident     Pt Name:Luis Bernal  MRN: [id number]  Birthdate 1975  Date of evaluation: 1/3/25  PCP:  Kym Mcduffie MD  Note Started: 2:25 PM EST      CHIEF COMPLAINT       Chief Complaint   Patient presents with    Back Injury       HISTORY OF PRESENT ILLNESS  (Location/Symptom, Timing/Onset, Context/Setting, Quality, Duration, Modifying Factors, Severity.)      Luis Bernal is a 49 y.o. male who presents with back pain, Workmen's Comp. injury.  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  This has been going on since Tuesday.  States he has a right lower sided back pain radiating down his leg.  Has been able to ambulate with some difficulty.  Denies any fall or specific trauma otherwise.  Did not hit his head or lose consciousness.  States he has history of lower lumbar back pain and had a discectomy several years ago.  Denies any chest pain, shortness of breath, abdominal pain, nausea, vomiting, diarrhea, difficulty urinating, defecating, weakness in his legs or any other complaints.    PAST MEDICAL / SURGICAL / SOCIAL / FAMILY HISTORY      has a past medical history of Hypertension.       has no past surgical history on file.    Social History     Socioeconomic History    Marital status:      Spouse name: Not on file    Number of children: Not on file    Years of education: Not on file    Highest education level: Not on file   Occupational History    Not on file   Tobacco Use    Smoking status: Never    Smokeless tobacco: Never   Substance and Sexual Activity    Alcohol use: Yes     Comment: occasional    Drug use: Never    Sexual activity: Not on file   Other Topics Concern    Not on file   Social History Narrative    Not on file     Social Determinants of Health     Financial Resource Strain: Not on file   Food Insecurity: No Food Insecurity (4/19/2024)     VITALS:   BP (!) 160/106   Pulse 88   Temp 97.5 °F (36.4 °C)   Resp 18   Wt 68 kg (150 lb)   SpO2 98%     Physical Exam  Constitutional:       Appearance: Normal appearance.   Cardiovascular:      Rate and Rhythm: Normal rate and regular rhythm.      Pulses: Normal pulses.      Heart sounds: Normal heart sounds.   Pulmonary:      Effort: Pulmonary effort is normal.      Breath sounds: Normal breath sounds.   Abdominal:      General: Abdomen is flat.      Palpations: Abdomen is soft.      Tenderness: There is no abdominal tenderness.   Musculoskeletal:      Comments: Right-sided lumbar  paraspinal muscle tenderness.  Positive straight leg raise on the right.  No weakness.  No midline tenderness to the CT or L-spine.   Skin:     General: Skin is warm and dry.      Capillary Refill: Capillary refill takes less than 2 seconds.   Neurological:      Mental Status: He is alert.           DDX/DIAGNOSTIC RESULTS / EMERGENCY DEPARTMENT COURSE / MDM     Medical Decision Making  Luis Bernal is a 49 y.o. male who presents with back pain.  Patient is GCS 15, nontoxic appearing, not in acute distress, speaking full sentences, able to ambulate under their own power.  Exam concerning for sciatica and paraspinal muscle strain.  Patient has no midline tenderness or red flag symptoms of back pain.  Plan on treating symptomatically with lidocaine patch, Toradol, prednisone burst dose.  Muscle relaxers for home.  Follow-up with occupational health.    Risk  OTC drugs.  Prescription drug management.        EKG    All EKG's are interpreted by the Emergency Department Physician who either signs or Co-signs this chart in the absence of a cardiologist.    EMERGENCY DEPARTMENT COURSE:           PROCEDURES:      CONSULTS:  None    CRITICAL CARE:  There was significant risk of life threatening deterioration of patient's condition requiring my direct management. Critical care time minutes, excluding any documented procedures.    FINAL

## 2025-01-03 NOTE — ED PROVIDER NOTES
[facility]     Emergency Department     Faculty Attestation  3:03 PM EST      I performed a history and physical examination of the patient and discussed management with the resident. I have reviewed and agree with the resident’s findings including all diagnostic interpretations, and treatment plans as written. Any areas of disagreement are noted on the chart. I was personally present for the key portions of any procedures. I have documented in the chart those procedures where I was not present during the key portions. I have reviewed the emergency nurses triage note. I agree with the chief complaint, past medical history, past surgical history, allergies, medications, social and family history as documented unless otherwise noted below. Documentation of the HPI, Physical Exam and Medical Decision Making performed by reidiblouise is based on my personal performance of the HPI, PE and MDM. For Physician Assistant/ Nurse Practitioner cases/documentation I have personally evaluated this patient and have completed at least one if not all key elements of the E/M (history, physical exam, and MDM). Additional findings are as noted.    Patient reports 3 days ago was helping a patient ambulate when all of a sudden they dropped their weight and patient on his right side had to support a patient's body weight suddenly reports that had some discomfort but really was worse the next day and even worse today.  He still has been at work and went initially to occupational health but they told him they were \"too busy\" patient does feel some discomfort into his right hip, and right anterior thigh.  No difficulty with urination, he has had a lumbar discectomy in the past.  And has occasional lumbar pain but this is worse than usual.    Patient on exam does have discomfort with rotating to the right side.  No redness or rash noted to the lumbar region, minimal discomfort on palpation  to the lumbar region.  Does have some pain with right hip flexion, but sensation to light touch is intact and 5 out of 5 lower extremity motor strength bilaterally.    Patient with likely musculoskeletal pain, with lumbar spasm.  Will plan on pain control and outpatient follow-up with occupational health.  Lidoderm patch    Roberta Martinez D.O, M.P.H  Attending Emergency Medicine Physician         Roberta Martinez,   01/03/25 [id number]